# Patient Record
Sex: FEMALE | Race: WHITE
[De-identification: names, ages, dates, MRNs, and addresses within clinical notes are randomized per-mention and may not be internally consistent; named-entity substitution may affect disease eponyms.]

---

## 2022-08-05 PROBLEM — Z00.00 ENCOUNTER FOR PREVENTIVE HEALTH EXAMINATION: Status: ACTIVE | Noted: 2022-08-05

## 2022-08-10 ENCOUNTER — APPOINTMENT (OUTPATIENT)
Dept: ORTHOPEDIC SURGERY | Facility: CLINIC | Age: 77
End: 2022-08-10

## 2022-08-10 VITALS — WEIGHT: 170 LBS | HEIGHT: 62 IN | BODY MASS INDEX: 31.28 KG/M2

## 2022-08-10 DIAGNOSIS — M65.321 TRIGGER FINGER, RIGHT INDEX FINGER: ICD-10-CM

## 2022-08-10 DIAGNOSIS — M65.322 TRIGGER FINGER, LEFT INDEX FINGER: ICD-10-CM

## 2022-08-10 DIAGNOSIS — M65.341 TRIGGER FINGER, RIGHT RING FINGER: ICD-10-CM

## 2022-08-10 DIAGNOSIS — M54.2 CERVICALGIA: ICD-10-CM

## 2022-08-10 DIAGNOSIS — M65.342 TRIGGER FINGER, LEFT RING FINGER: ICD-10-CM

## 2022-08-10 DIAGNOSIS — M65.331 TRIGGER FINGER, RIGHT MIDDLE FINGER: ICD-10-CM

## 2022-08-10 DIAGNOSIS — M65.351 TRIGGER FINGER, RIGHT LITTLE FINGER: ICD-10-CM

## 2022-08-10 DIAGNOSIS — M65.352 TRIGGER FINGER, LEFT LITTLE FINGER: ICD-10-CM

## 2022-08-10 DIAGNOSIS — M65.332 TRIGGER FINGER, LEFT MIDDLE FINGER: ICD-10-CM

## 2022-08-10 PROCEDURE — 20550 NJX 1 TENDON SHEATH/LIGAMENT: CPT

## 2022-08-10 PROCEDURE — 99203 OFFICE O/P NEW LOW 30 MIN: CPT | Mod: 25

## 2022-08-10 PROCEDURE — 76942 ECHO GUIDE FOR BIOPSY: CPT

## 2022-08-10 PROCEDURE — 73130 X-RAY EXAM OF HAND: CPT | Mod: 50

## 2022-08-10 PROCEDURE — 72040 X-RAY EXAM NECK SPINE 2-3 VW: CPT

## 2022-08-10 NOTE — HISTORY OF PRESENT ILLNESS
[10] : 10 [5] : 5 [Burning] : burning [Sharp] : sharp [Shooting] : shooting [Stabbing] : stabbing [Tingling] : tingling [Constant] : constant [Household chores] : household chores [Sleep] : sleep [Lying in bed] : lying in bed [de-identified] : P [] : no [FreeTextEntry1] : Bilateral Hands Bilateral Wrists  [FreeTextEntry5] : 8/10 pain and tingling b hands, has had neck issues. Patient states she has cancer  [FreeTextEntry7] : upward to shoulder  [FreeTextEntry9] : Hand exercises at home

## 2022-08-10 NOTE — PHYSICAL EXAM
[] : paracervical tenderness [Facet arthropathy] : Facet arthropathy [Right] : right hand [Left] : left hand [2nd] : 2nd [3rd] : 3rd [4th] : 4th [5th] : 5th [A1-Pulley] : A1-pulley

## 2022-09-02 ENCOUNTER — APPOINTMENT (OUTPATIENT)
Dept: ORTHOPEDIC SURGERY | Facility: CLINIC | Age: 77
End: 2022-09-02

## 2022-09-02 VITALS — WEIGHT: 170 LBS | HEIGHT: 62 IN | BODY MASS INDEX: 31.28 KG/M2

## 2022-09-02 DIAGNOSIS — I10 ESSENTIAL (PRIMARY) HYPERTENSION: ICD-10-CM

## 2022-09-02 PROCEDURE — J3490M: CUSTOM

## 2022-09-02 PROCEDURE — 20526 THER INJECTION CARP TUNNEL: CPT | Mod: 50

## 2022-09-02 PROCEDURE — 99213 OFFICE O/P EST LOW 20 MIN: CPT | Mod: 25

## 2022-09-02 NOTE — HISTORY OF PRESENT ILLNESS
[6] : 6 [5] : 5 [Dull/Aching] : dull/aching [de-identified] : She is cancer patient recnetlyt started having burning in both hasnds\par Specifically at night\par \par Had mult trigger injecitons by [facility] 2 weeks ago\par  [] : no [FreeTextEntry1] : hands [FreeTextEntry3] : June 2022 [FreeTextEntry5] : she is a cancer patient and developed numbness and burning sensation in both hands after treatment [de-identified] : 8/10/22 [de-identified] : Dr. Henry [de-identified] : had 5 CSI in right hand with relief by Dr. Henry in Aug 2022

## 2022-09-02 NOTE — ASSESSMENT
[FreeTextEntry1] : Braces at night provided today\par EMG\par Return after EMG\par \par bilateral Carpal tunnel injection was performed because of pain inflammation\par Anesthesia: ethyl chloride sprayed topically\par Celestone: An injection of Celestone 1cc\par Lidocaine: An injection of Lidocaine 1% 1cc\par Marcaine: An injection of Marcaine 0.5% 1cc\par x2\par \par Patient has tried OTC's including aspirin, Ibuprofen, Aleve etc or prescription NSAIDS, and/or exercises at home and/ or\par physical therapy without satisfactory response.\par After verbal consent using sterile preparation and technique. The risks, benefits, and alternatives to cortisone injection\par were explained in full to the patient. Risks outlined include but are not limited to infection, sepsis, bleeding, scarring, skin\par discoloration, temporary increase in pain, syncopal episode, failure to resolve symptoms, allergic reaction, symptom\par recurrence, and elevation of blood sugar in diabetics. Patient understood the risks. All questions were answered. After\par discussion of options, patient requested an injection. Oral informed consent was obtained and sterile prep was done of the\par injection site. Sterile technique was utilized for the procedure including the preparation of the solutions used for the\par injection. Patient tolerated the procedure well. Advised to ice the injection site this evening.\par Prep with betadine locally to site. Sterile technique used

## 2022-09-02 NOTE — PHYSICAL EXAM
[de-identified] : R hand: \par Tender volar wrist \par Good finger ROM \par +Tinels \par +Phalens \par +Compression test \par Decreased sensation median nerve distribution\par \par \par L hand: \par Tender volar wrist \par Good finger ROM \par +Tinels \par +Phalens \par +Compression test \par Decreased sensation median nerve distribution\par

## 2022-09-07 ENCOUNTER — APPOINTMENT (OUTPATIENT)
Dept: NEUROLOGY | Facility: CLINIC | Age: 77
End: 2022-09-07

## 2022-09-07 DIAGNOSIS — R20.2 ANESTHESIA OF SKIN: ICD-10-CM

## 2022-09-07 DIAGNOSIS — M79.642 PAIN IN RIGHT HAND: ICD-10-CM

## 2022-09-07 DIAGNOSIS — M79.641 PAIN IN RIGHT HAND: ICD-10-CM

## 2022-09-07 DIAGNOSIS — R20.0 ANESTHESIA OF SKIN: ICD-10-CM

## 2022-09-07 DIAGNOSIS — G56.03 CARPAL TUNNEL SYNDROM,BILATERAL UPPER LIMBS: ICD-10-CM

## 2022-09-07 PROCEDURE — 95886 MUSC TEST DONE W/N TEST COMP: CPT

## 2022-09-07 PROCEDURE — 95912 NRV CNDJ TEST 11-12 STUDIES: CPT

## 2022-09-13 ENCOUNTER — APPOINTMENT (OUTPATIENT)
Dept: ORTHOPEDIC SURGERY | Facility: CLINIC | Age: 77
End: 2022-09-13

## 2022-09-13 VITALS — HEIGHT: 62 IN | WEIGHT: 170 LBS | BODY MASS INDEX: 31.28 KG/M2

## 2022-09-13 DIAGNOSIS — G56.01 CARPAL TUNNEL SYNDROME, RIGHT UPPER LIMB: ICD-10-CM

## 2022-09-13 DIAGNOSIS — G56.02 CARPAL TUNNEL SYNDROME, LEFT UPPER LIMB: ICD-10-CM

## 2022-09-13 PROCEDURE — 99213 OFFICE O/P EST LOW 20 MIN: CPT

## 2022-09-14 ENCOUNTER — APPOINTMENT (OUTPATIENT)
Dept: ORTHOPEDIC SURGERY | Facility: CLINIC | Age: 77
End: 2022-09-14

## 2022-09-14 NOTE — HISTORY OF PRESENT ILLNESS
[Dull/Aching] : dull/aching [4] : 4 [2] : 2 [de-identified] : EMG shows mild CTS\par Injection helped [FreeTextEntry1] :  B/L hands  [FreeTextEntry5] : pain is better\par  [de-identified] : brace

## 2022-09-14 NOTE — PHYSICAL EXAM
[de-identified] : R hand: \par Tender volar wrist \par Good finger ROM \par +Tinels \par +Phalens \par +Compression test \par Decreased sensation median nerve distribution\par \par \par L hand: \par Tender volar wrist \par Good finger ROM \par +Tinels \par +Phalens \par +Compression test \par Decreased sensation median nerve distribution\par

## 2022-09-20 ENCOUNTER — APPOINTMENT (OUTPATIENT)
Dept: ORTHOPEDIC SURGERY | Facility: CLINIC | Age: 77
End: 2022-09-20

## 2022-09-20 VITALS — WEIGHT: 170 LBS | HEIGHT: 62 IN | BODY MASS INDEX: 31.28 KG/M2

## 2022-09-20 DIAGNOSIS — M19.012 PRIMARY OSTEOARTHRITIS, LEFT SHOULDER: ICD-10-CM

## 2022-09-20 DIAGNOSIS — M19.011 PRIMARY OSTEOARTHRITIS, RIGHT SHOULDER: ICD-10-CM

## 2022-09-20 PROCEDURE — 20610 DRAIN/INJ JOINT/BURSA W/O US: CPT | Mod: 50

## 2022-09-20 PROCEDURE — 73562 X-RAY EXAM OF KNEE 3: CPT | Mod: 50

## 2022-09-20 PROCEDURE — 99214 OFFICE O/P EST MOD 30 MIN: CPT | Mod: 25

## 2022-10-04 ENCOUNTER — APPOINTMENT (OUTPATIENT)
Dept: ORTHOPEDIC SURGERY | Facility: CLINIC | Age: 77
End: 2022-10-04

## 2022-10-04 VITALS — WEIGHT: 170 LBS | HEIGHT: 62 IN | BODY MASS INDEX: 31.28 KG/M2

## 2022-10-04 PROCEDURE — 20610 DRAIN/INJ JOINT/BURSA W/O US: CPT | Mod: 50

## 2022-10-04 PROCEDURE — 99024 POSTOP FOLLOW-UP VISIT: CPT

## 2022-10-10 ENCOUNTER — APPOINTMENT (OUTPATIENT)
Dept: ORTHOPEDIC SURGERY | Facility: CLINIC | Age: 77
End: 2022-10-10

## 2022-10-10 VITALS — BODY MASS INDEX: 31.28 KG/M2 | HEIGHT: 62 IN | WEIGHT: 170 LBS

## 2022-10-10 PROCEDURE — 99024 POSTOP FOLLOW-UP VISIT: CPT

## 2022-10-10 PROCEDURE — 20610 DRAIN/INJ JOINT/BURSA W/O US: CPT | Mod: 50

## 2022-10-11 ENCOUNTER — RX RENEWAL (OUTPATIENT)
Age: 77
End: 2022-10-11

## 2022-10-11 ENCOUNTER — APPOINTMENT (OUTPATIENT)
Dept: ORTHOPEDIC SURGERY | Facility: CLINIC | Age: 77
End: 2022-10-11

## 2022-10-11 NOTE — IMAGING
[Bilateral] : knee bilaterally [All Views] : anteroposterior, lateral, skyline, and anteroposterior standing [advanced tricompartmental OA with medial compartment narrowing and varus alignment] : advanced tricompartmental OA with medial compartment narrowing and varus alignment [de-identified] : \par ------------------------------------------------------------------------------------------------------------------------------------------------------\par \par Bilateral knee exam: \par \par Inspection: \par    min Effusion\par    (-) Malalignment\par    (-) Swelling\par    (-) Quad atrophy\par    (-) J-sign\par ROM: \par    0 - 125 degrees of flexion.\par Tenderness: \par    +MJLT\par    (-) LJLT\par    +Medial patellar facet tenderness\par    +Lateral patellar facet tenderness\par    (-) Crepitus\par    (-) Patellar grind tenderness\par    (-) Patellar tendon\par    (-) Quad tendon\par Strength: 5/5 Q/H/TA/GS/EHL\par Neuro: In tact to light touch throughout, DTR's wnl\par Vascularity: Extremity warm and well perfused\par Gait: normal\par

## 2022-10-11 NOTE — HISTORY OF PRESENT ILLNESS
[Gradual] : gradual [5] : 5 [Frequent] : frequent [3] : 3 [Orthovisc] : Orthovisc [de-identified] : This is Ms. VERONICA OLSZEWSKI  a 77 year old female who comes in today complaining of bilateral knee pain.  Has a history of OA and Orthovisc series in fast.  Also complaining of bilateral shoulder pain. had shoulder injections with dr. solitario in the past.\par \par bilateral knee orthovisc #3\par recently diagnosed with some type of cancer and having infusions of tecentriq. feels she is having joint side effects from the treatment. recently started a MDP and feels much better.  she is also referred to rheum [] : no [FreeTextEntry9] : MDP [de-identified] : 10/4/22

## 2022-10-11 NOTE — DISCUSSION/SUMMARY
[de-identified] : orthovisc bilateral knees today. fu 1 week. hold off on shoulders. she will also see rheum. \par \par ------------------------------------------------------------------------------------------------------------------------------------------------------\par \par The patient was advised of the diagnosis.  The natural history of the pathology was explained in full. All questions were answered.  The risks and benefits of conservative and interventional treatment alternatives were explained to the patient\par \par \par ------------------------------------------------------------------------------------------------------------------------------------------------------\par \par Large joint injections given: Hyaluronic acid/viscosupplementation to Bilateral knees\par \par Viscosupplementation injection indications at this time include- X-ray evidence of osteoarthritis on this or prior visits, and patient having tried OTC's including aspirin, Ibuprofen, Aleve etc or prescription NSAIDS, and/or exercises at home and/ or physical therapy without satisfactory response.\par \par The risks, benefits, and alternatives to viscosupplementation injection were explained in full to the patient. Risks outlined include but are not limited to infection, sepsis, bleeding, scarring, skin discoloration, temporary increase in pain, syncopal episode, failure to resolve symptoms, allergic reaction, and symptom recurrence.  Patient understood the risks. All questions were answered. After discussion of options, the patient requested viscosupplementation. \par \par An injection of Hyaluronic acid of appropriate formulation was injected into the joint(s) after verbal consent, using sterile technique. The patient tolerated the procedure well and there were no complications.  Instructed patient to apply ice to the injection site. Signs and symptoms of infection reviewed and patient advised to call immediately for redness, fevers, and/or chills.\par \par

## 2022-10-18 ENCOUNTER — APPOINTMENT (OUTPATIENT)
Dept: ORTHOPEDIC SURGERY | Facility: CLINIC | Age: 77
End: 2022-10-18

## 2022-10-18 VITALS — BODY MASS INDEX: 31.28 KG/M2 | HEIGHT: 62 IN | WEIGHT: 170 LBS

## 2022-10-18 DIAGNOSIS — M17.12 UNILATERAL PRIMARY OSTEOARTHRITIS, LEFT KNEE: ICD-10-CM

## 2022-10-18 DIAGNOSIS — M17.11 UNILATERAL PRIMARY OSTEOARTHRITIS, RIGHT KNEE: ICD-10-CM

## 2022-10-18 PROCEDURE — 99024 POSTOP FOLLOW-UP VISIT: CPT

## 2022-10-18 PROCEDURE — 20610 DRAIN/INJ JOINT/BURSA W/O US: CPT | Mod: 50

## 2022-10-18 NOTE — DISCUSSION/SUMMARY
[de-identified] : orthovisc bilateral knees today. fu 1 week. hold off on shoulders.\par \par ------------------------------------------------------------------------------------------------------------------------------------------------------\par \par The patient was advised of the diagnosis.  The natural history of the pathology was explained in full. All questions were answered.  The risks and benefits of conservative and interventional treatment alternatives were explained to the patient\par \par \par ------------------------------------------------------------------------------------------------------------------------------------------------------\par \par Large joint injections given: Hyaluronic acid/viscosupplementation to Bilateral knees\par \par Viscosupplementation injection indications at this time include- X-ray evidence of osteoarthritis on this or prior visits, and patient having tried OTC's including aspirin, Ibuprofen, Aleve etc or prescription NSAIDS, and/or exercises at home and/ or physical therapy without satisfactory response.\par \par The risks, benefits, and alternatives to viscosupplementation injection were explained in full to the patient. Risks outlined include but are not limited to infection, sepsis, bleeding, scarring, skin discoloration, temporary increase in pain, syncopal episode, failure to resolve symptoms, allergic reaction, and symptom recurrence.  Patient understood the risks. All questions were answered. After discussion of options, the patient requested viscosupplementation. \par \par An injection of Hyaluronic acid of appropriate formulation was injected into the joint(s) after verbal consent, using sterile technique. The patient tolerated the procedure well and there were no complications.  Instructed patient to apply ice to the injection site. Signs and symptoms of infection reviewed and patient advised to call immediately for redness, fevers, and/or chills.\par \par

## 2022-10-18 NOTE — IMAGING
[Bilateral] : knee bilaterally [All Views] : anteroposterior, lateral, skyline, and anteroposterior standing [advanced tricompartmental OA with medial compartment narrowing and varus alignment] : advanced tricompartmental OA with medial compartment narrowing and varus alignment [de-identified] : \par ------------------------------------------------------------------------------------------------------------------------------------------------------\par \par Bilateral knee exam: \par \par Inspection: \par    min Effusion\par    (-) Malalignment\par    (-) Swelling\par    (-) Quad atrophy\par    (-) J-sign\par ROM: \par    0 - 125 degrees of flexion.\par Tenderness: \par    +MJLT\par    (-) LJLT\par    +Medial patellar facet tenderness\par    +Lateral patellar facet tenderness\par    (-) Crepitus\par    (-) Patellar grind tenderness\par    (-) Patellar tendon\par    (-) Quad tendon\par Strength: 5/5 Q/H/TA/GS/EHL\par Neuro: In tact to light touch throughout, DTR's wnl\par Vascularity: Extremity warm and well perfused\par Gait: normal\par

## 2022-10-18 NOTE — HISTORY OF PRESENT ILLNESS
[Gradual] : gradual [5] : 5 [Frequent] : frequent [4] : 4 [Orthovisc] : Orthovisc [de-identified] : This is Ms. VERONICA OLSZEWSKI  a 77 year old female who comes in today complaining of bilateral knee pain.  Has a history of OA and Orthovisc series in fast.  Also complaining of bilateral shoulder pain. had shoulder injections with dr. solitario in the past.\par \par bilateral knee orthovisc #4\par recently diagnosed with some type of cancer and having infusions of tecentriq. feels she is having joint side effects from the treatment. recently started a MDP and feels much better.  she is also referred to rheum [] : no [FreeTextEntry9] : MDP [de-identified] : 10/10/22

## 2022-11-22 NOTE — IMAGING
[Bilateral] : knee bilaterally [All Views] : anteroposterior, lateral, skyline, and anteroposterior standing [advanced tricompartmental OA with medial compartment narrowing and varus alignment] : advanced tricompartmental OA with medial compartment narrowing and varus alignment [de-identified] : \par ------------------------------------------------------------------------------------------------------------------------------------------------------\par \par Bilateral knee exam: \par \par Inspection: \par    min Effusion\par    (-) Malalignment\par    (-) Swelling\par    (-) Quad atrophy\par    (-) J-sign\par ROM: \par    0 - 125 degrees of flexion.\par Tenderness: \par    +MJLT\par    (-) LJLT\par    +Medial patellar facet tenderness\par    +Lateral patellar facet tenderness\par    (-) Crepitus\par    (-) Patellar grind tenderness\par    (-) Patellar tendon\par    (-) Quad tendon\par Strength: 5/5 Q/H/TA/GS/EHL\par Neuro: In tact to light touch throughout, DTR's wnl\par Vascularity: Extremity warm and well perfused\par Gait: normal\par

## 2022-11-22 NOTE — DISCUSSION/SUMMARY
[de-identified] : orthovisc bilateral knees today. fu 1 week. hold off on shoulders. she will also see rheum. \par \par ------------------------------------------------------------------------------------------------------------------------------------------------------\par \par The patient was advised of the diagnosis.  The natural history of the pathology was explained in full. All questions were answered.  The risks and benefits of conservative and interventional treatment alternatives were explained to the patient\par \par \par ------------------------------------------------------------------------------------------------------------------------------------------------------\par \par Large joint injections given: Hyaluronic acid/viscosupplementation to Bilateral knees\par \par Viscosupplementation injection indications at this time include- X-ray evidence of osteoarthritis on this or prior visits, and patient having tried OTC's including aspirin, Ibuprofen, Aleve etc or prescription NSAIDS, and/or exercises at home and/ or physical therapy without satisfactory response.\par \par The risks, benefits, and alternatives to viscosupplementation injection were explained in full to the patient. Risks outlined include but are not limited to infection, sepsis, bleeding, scarring, skin discoloration, temporary increase in pain, syncopal episode, failure to resolve symptoms, allergic reaction, and symptom recurrence.  Patient understood the risks. All questions were answered. After discussion of options, the patient requested viscosupplementation. \par \par An injection of Hyaluronic acid of appropriate formulation was injected into the joint(s) after verbal consent, using sterile technique. The patient tolerated the procedure well and there were no complications.  Instructed patient to apply ice to the injection site. Signs and symptoms of infection reviewed and patient advised to call immediately for redness, fevers, and/or chills.\par \par Medication - orthovisc 30mg  in Right knee and orthovisc 30 mg in Left knee.

## 2022-11-22 NOTE — REASON FOR VISIT
[FreeTextEntry2] : This is Ms. VERONICA OLSZEWSKI  a 77 year old female who comes in today complaining of bilateral knee pain.  Has a history of OA and Orthovisc series in fast.  Also complaining of bilateral shoulder pain. had shoulder injections with dr. solitario in the past.\par recently diagnosed with some type of cancer and having infusions of tecentriq. feels she is having joint side effects from the treatment. recently started a MDP and feels much better.  she is also referred to rheum

## 2022-12-04 NOTE — REASON FOR VISIT
[FreeTextEntry2] : This is Ms. VERONICA OLSZEWSKI  a 77 year old female who comes in today complaining of bilateral knee pain.  Has a history of OA and Orthovisc series in fast.  Also complaining of bilateral shoulder pain. had shoulder injections with dr. solitario in the past.\par \par bilateral knee orthovisc #2\par recently diagnosed with some type of cancer and having infusions of tecentriq. feels she is having joint side effects from the treatment. recently started a MDP and feels much better.  she is also referred to rheum

## 2022-12-04 NOTE — DISCUSSION/SUMMARY
[de-identified] : orthovisc bilateral knees today. fu 1 week. hold off on shoulders. she will also see rheum. \par \par ------------------------------------------------------------------------------------------------------------------------------------------------------\par \par The patient was advised of the diagnosis.  The natural history of the pathology was explained in full. All questions were answered.  The risks and benefits of conservative and interventional treatment alternatives were explained to the patient\par \par \par ------------------------------------------------------------------------------------------------------------------------------------------------------\par \par Large joint injections given: Hyaluronic acid/viscosupplementation to Bilateral knees\par \par Viscosupplementation injection indications at this time include- X-ray evidence of osteoarthritis on this or prior visits, and patient having tried OTC's including aspirin, Ibuprofen, Aleve etc or prescription NSAIDS, and/or exercises at home and/ or physical therapy without satisfactory response.\par \par The risks, benefits, and alternatives to viscosupplementation injection were explained in full to the patient. Risks outlined include but are not limited to infection, sepsis, bleeding, scarring, skin discoloration, temporary increase in pain, syncopal episode, failure to resolve symptoms, allergic reaction, and symptom recurrence.  Patient understood the risks. All questions were answered. After discussion of options, the patient requested viscosupplementation. \par \par An injection of Hyaluronic acid of appropriate formulation was injected into the joint(s) after verbal consent, using sterile technique. The patient tolerated the procedure well and there were no complications.  Instructed patient to apply ice to the injection site. Signs and symptoms of infection reviewed and patient advised to call immediately for redness, fevers, and/or chills.\par \par

## 2022-12-04 NOTE — IMAGING
[Bilateral] : knee bilaterally [All Views] : anteroposterior, lateral, skyline, and anteroposterior standing [advanced tricompartmental OA with medial compartment narrowing and varus alignment] : advanced tricompartmental OA with medial compartment narrowing and varus alignment [de-identified] : \par ------------------------------------------------------------------------------------------------------------------------------------------------------\par \par Bilateral knee exam: \par \par Inspection: \par    min Effusion\par    (-) Malalignment\par    (-) Swelling\par    (-) Quad atrophy\par    (-) J-sign\par ROM: \par    0 - 125 degrees of flexion.\par Tenderness: \par    +MJLT\par    (-) LJLT\par    +Medial patellar facet tenderness\par    +Lateral patellar facet tenderness\par    (-) Crepitus\par    (-) Patellar grind tenderness\par    (-) Patellar tendon\par    (-) Quad tendon\par Strength: 5/5 Q/H/TA/GS/EHL\par Neuro: In tact to light touch throughout, DTR's wnl\par Vascularity: Extremity warm and well perfused\par Gait: normal\par

## 2022-12-04 NOTE — HISTORY OF PRESENT ILLNESS
[Gradual] : gradual [5] : 5 [Frequent] : frequent [2] : 2 [Orthovisc] : Orthovisc [] : no [FreeTextEntry9] : MDP [de-identified] : 9/20/22

## 2022-12-08 RX ORDER — MELOXICAM 15 MG/1
15 TABLET ORAL
Qty: 25 | Refills: 0 | Status: ACTIVE | COMMUNITY
Start: 2022-09-13 | End: 1900-01-01